# Patient Record
Sex: MALE | Race: WHITE | NOT HISPANIC OR LATINO | Employment: FULL TIME | URBAN - METROPOLITAN AREA
[De-identification: names, ages, dates, MRNs, and addresses within clinical notes are randomized per-mention and may not be internally consistent; named-entity substitution may affect disease eponyms.]

---

## 2018-04-11 ENCOUNTER — OFFICE VISIT (OUTPATIENT)
Dept: ORTHOPEDIC SURGERY | Facility: CLINIC | Age: 28
End: 2018-04-11

## 2018-04-11 VITALS
WEIGHT: 152 LBS | SYSTOLIC BLOOD PRESSURE: 119 MMHG | BODY MASS INDEX: 20.15 KG/M2 | HEART RATE: 84 BPM | DIASTOLIC BLOOD PRESSURE: 76 MMHG | HEIGHT: 73 IN

## 2018-04-11 DIAGNOSIS — Z72.0 TOBACCO ABUSE: ICD-10-CM

## 2018-04-11 DIAGNOSIS — S82.891A CLOSED FRACTURE OF RIGHT ANKLE, INITIAL ENCOUNTER: Primary | ICD-10-CM

## 2018-04-11 PROCEDURE — 99203 OFFICE O/P NEW LOW 30 MIN: CPT | Performed by: PHYSICIAN ASSISTANT

## 2018-04-11 PROCEDURE — 99406 BEHAV CHNG SMOKING 3-10 MIN: CPT | Performed by: PHYSICIAN ASSISTANT

## 2018-04-11 NOTE — PROGRESS NOTES
"NEW PATIENT    Patient: Barron Cutler  : 1990    Primary Care Provider: No Known Provider    Requesting Provider: As above    Edema and Pain of the Right Ankle      History    Chief Complaint: Right ankle pain    History of Present Illness: This is a pleasant 27-year-old male referral from urgent treatment here with 2 month history of right ankle pain since a fall down the stairs and February 3, 2018.  He complains of resolved pain at this point, but that the inside of the ankle feels \"weird\" he reports all swelling and bruising has resolved.  He reports he went to urgent treatment where x-rays were taken showing a distal medial malleolus fracture.  He was placed in a posterior splint and has been using crutches since that time.  He reports he was also given a boot but found that cumbersome.  He admits to putting some weight on the right leg as well.  He has been off work since the injury.  He states he thought he was supposed receive a call from us to schedule a appointment that is why it is taking 2 months for him to get an.    No current outpatient prescriptions on file prior to visit.     No current facility-administered medications on file prior to visit.       No Known Allergies   History reviewed. No pertinent past medical history.  No past surgical history on file.  History reviewed. No pertinent family history.   Social History     Social History   • Marital status:      Spouse name: N/A   • Number of children: N/A   • Years of education: N/A     Occupational History   • Not on file.     Social History Main Topics   • Smoking status: Current Every Day Smoker     Packs/day: 1.00     Years: 11.00     Types: Cigarettes     Start date:    • Smokeless tobacco: Never Used   • Alcohol use Yes      Comment: occasional   • Drug use: No   • Sexual activity: Defer     Other Topics Concern   • Not on file     Social History Narrative   • No narrative on file        Review of Systems   Constitutional: " "Negative.    HENT: Negative.    Eyes: Negative.    Respiratory: Negative.    Cardiovascular: Negative.    Gastrointestinal: Negative.    Endocrine: Negative.    Genitourinary: Negative.    Musculoskeletal: Positive for arthralgias and joint swelling.   Skin: Negative.    Allergic/Immunologic: Negative.    Neurological: Negative.    Hematological: Negative.    Psychiatric/Behavioral: Negative.        The following portions of the patient's history were reviewed and updated as appropriate: allergies, current medications, past family history, past medical history, past social history, past surgical history and problem list.    Physical Exam:   /76   Pulse 84   Ht 185.4 cm (73\")   Wt 68.9 kg (152 lb)   BMI 20.05 kg/m²   GENERAL: Body habitus: normal weight for height    Lower extremity edema: Left: none; Right: none    Varicose veins:  Left: none; Right: none    Gait: using crutches     Mental Status:  awake and alert; oriented to person, place, and time    Voice:  clear  SKIN:  Normal    Hair Growth:  Right:normal; Left:  normal  NAILS: Toenails: normal  HEENT: Head: Normocephalic, atraumatic,  without obvious abnormality.  PULM:  Repiratory effort normal  CV:  Dorsalis Pedis:  Right: 1+; Left:2+    Posterior Tibial: Right:1+; Left:2+    Capillary Refill:  Brisk  MSK:  Foot:  Right:  tender Mild over the medial malleolus.  No lateral tenderness., ROM  Mild stiffness and motor function  normal; Left:  non tender, ROM  normal and motor function  normal      NEURO: Lower extremity sensation: intact     Calf Atrophy:atrophy of the right calf     Motor Function: all 5/5         Medical Decision Making    Data Review:   ordered and reviewed x-rays today    Assessment and Plan/ Diagnosis/Treatment options:   1.  Right medial malleolus fracture.  I reviewed today's x-rays and clinical findings with the patient.  On exam, he is mildly tender over the medial malleolus with stiffness as expected.  He has intact " strength.  X-rays today show a healed distal medial malleolus fracture with diffuse disuse osteopenia.  I explained to the patient that we may be able to see the fracture line on x-ray long-term, but he will be able to function normally and it will be well-healed.  Recommendation today is that patient go into a tall boot weightbearing in the boot for several weeks.  I would like him to then slowly wean himself out of the boot into regular shoe.  He does not have any insurance and is unable to do formal physical therapy.  I've printed out a sheet of exercises for him to start working on strength and motion of the ankle.  I expect him to be into regular sure when he returns to see us in the office.  I will see him back in 6 weeks with repeat x-rays or sooner if needed.    2.  I explained the risks of smoking, including hardening of the arteries, gangrene, amputation.  I explained smoking poisons bone cells and increases the risk of nonunion of fractures and fusions.  I explained that studies show that smokers have FOUR times the risk of the general population when they have foot or ankle surgery.  (5min)

## 2018-06-08 ENCOUNTER — OFFICE VISIT (OUTPATIENT)
Dept: ORTHOPEDIC SURGERY | Facility: CLINIC | Age: 28
End: 2018-06-08

## 2018-06-08 VITALS — HEIGHT: 73 IN | WEIGHT: 139.55 LBS | OXYGEN SATURATION: 99 % | HEART RATE: 66 BPM | BODY MASS INDEX: 18.5 KG/M2

## 2018-06-08 DIAGNOSIS — S82.891D CLOSED FRACTURE OF RIGHT ANKLE WITH ROUTINE HEALING, SUBSEQUENT ENCOUNTER: Primary | ICD-10-CM

## 2018-06-08 PROCEDURE — 99213 OFFICE O/P EST LOW 20 MIN: CPT | Performed by: PHYSICIAN ASSISTANT

## 2018-06-08 NOTE — PROGRESS NOTES
[facility] – [place] [facility]    Subjective     Patient: Barron Cutler  : 1990    Primary Care Provider: No Known Provider    Requesting Provider: As above    Follow-up (8 weeks f/u Right medial malleolus fracture)      History    Chief Complaint: Follow-up right medial malleolus fracture    History of Present Illness: Patient returns today for follow-up of his right medial malleolus fracture with injury on February 3, 2018.  He has been weightbearing in a regular shoe outside of work.  For work, he has been using a short boot.  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  No new symptoms.    No current outpatient prescriptions on file prior to visit.     No current facility-administered medications on file prior to visit.       No Known Allergies   History reviewed. No pertinent past medical history.  No past surgical history on file.  History reviewed. No pertinent family history.   Social History     Social History   • Marital status:      Spouse name: N/A   • Number of children: N/A   • Years of education: N/A     Occupational History   • Not on file.     Social History Main Topics   • Smoking status: Current Every Day Smoker     Packs/day: 1.00     Years: 11.00     Types: Cigarettes     Start date:    • Smokeless tobacco: Never Used   • Alcohol use Yes      Comment: occasional   • Drug use: No   • Sexual activity: Defer     Other Topics Concern   • Not on file     Social History Narrative   • No narrative on file        Review of Systems   Constitutional: Negative.    HENT: Negative.    Eyes: Negative.    Respiratory: Negative.    Cardiovascular: Negative.    Gastrointestinal: Negative.    Endocrine: Negative.    Genitourinary: Negative.    Musculoskeletal: Positive for arthralgias (Right ankle).   Skin: Negative.    Allergic/Immunologic: Negative.    Neurological: Negative.    Hematological: Negative.   "  Psychiatric/Behavioral: Negative.        The following portions of the patient's history were reviewed and updated as appropriate: allergies, current medications, past family history, past medical history, past social history, past surgical history and problem list.      Objective      Physical Exam  Pulse 66   Ht 185.4 cm (72.99\")   Wt 63.3 kg (139 lb 8.8 oz)   SpO2 99%   BMI 18.42 kg/m²     Body mass index is 18.42 kg/m².      Ortho Exam  Right ankle exam:  Nontender over the medial malleolus and about the remainder of the ankle and foot  Range of motion 10° course dorsiflexion, 40° plantar flexion, 10° inversion eversion  5/5 motor strength with intact sensation  Neurovascular intact  Pulses 2+    Medical Decision Making    Data Review:   ordered and reviewed x-rays today    Assessment:  1. Closed fracture of right ankle with routine healing, subsequent encounter        Plan:  Healed right medial malleolus fracture.  I reviewed today's x-rays and clinical findings with the patient on exam, he is nontender with good range of motion and normal strength.  X-rays show the fracture is now healed.  He may now return to his normal activity.  I encouraged him to slowly go back into his regular shoe instead of wearing that boot for work.  He'll work on strengthening activity and exercises on his own.  He'll return to see us as needed.      Yasmin Rascon  06/08/18  9:58 AM  "

## 2019-09-09 ENCOUNTER — OFFICE VISIT (OUTPATIENT)
Dept: ORTHOPEDIC SURGERY | Facility: CLINIC | Age: 29
End: 2019-09-09

## 2019-09-09 VITALS — BODY MASS INDEX: 21.86 KG/M2 | WEIGHT: 164.9 LBS | HEIGHT: 73 IN

## 2019-09-09 DIAGNOSIS — S62.111A CLOSED CHIP FRACTURE OF TRIQUETRUM OF RIGHT WRIST, INITIAL ENCOUNTER: Primary | ICD-10-CM

## 2019-09-09 PROCEDURE — 99204 OFFICE O/P NEW MOD 45 MIN: CPT | Performed by: ORTHOPAEDIC SURGERY

## 2019-09-09 RX ORDER — IBUPROFEN 400 MG/1
400 TABLET ORAL EVERY 6 HOURS PRN
COMMUNITY
End: 2020-06-18

## 2019-09-09 NOTE — PROGRESS NOTES
[facility] [place] [facility]    Subjective     Chief Complaint   Patient presents with   • Right Wrist - Pain        HPI  Barron Cutler is a 28 y.o. male.  Patient fell last Thursday 9 5 and fractured his right wrist.  Pain is 3 out of 10.  He takes ibuprofen.  He works at [employer].  He is still working in the splint.    History reviewed. No pertinent past medical history.   No past surgical history on file.   History reviewed. No pertinent family history.  Social History     Socioeconomic History   • Marital status:      Spouse name: Not on file   • Number of children: Not on file   • Years of education: Not on file   • Highest education level: Not on file   Tobacco Use   • Smoking status: Current Every Day Smoker     Packs/day: 1.00     Years: 11.00     Pack years: 11.00     Types: Cigarettes     Start date: 2007   • Smokeless tobacco: Never Used   Substance and Sexual Activity   • Alcohol use: Yes     Comment: occasional   • Drug use: No   • Sexual activity: Defer      Current Outpatient Medications on File Prior to Visit   Medication Sig Dispense Refill   • ibuprofen (ADVIL,MOTRIN) 400 MG tablet Take 400 mg by mouth Every 6 (Six) Hours As Needed for Mild Pain .       No current facility-administered medications on file prior to visit.       No Known Allergies     The following portions of the patient's history were reviewed and updated as appropriate: allergies, current medications, past family history, past medical history, past social history, past surgical history and problem list.    Review of Systems   Constitutional: Negative.    HENT: Negative.    Eyes: Negative.    Respiratory: Negative.    Cardiovascular: Negative.    Gastrointestinal: Negative.    Endocrine: Negative.    Genitourinary: Negative.    Musculoskeletal: Positive for arthralgias and joint swelling.   Skin: Negative.    Allergic/Immunologic: Negative.    Neurological: Negative.    Hematological: Negative.   "  Psychiatric/Behavioral: Negative.         Objective      Physical Exam  Ht 185.4 cm (72.99\")   Wt 74.8 kg (164 lb 14.5 oz)   BMI 21.76 kg/m²     Body mass index is 21.76 kg/m².    GENERAL APPEARANCE: awake, alert & oriented x 3, in no acute distress and well developed, well nourished  PSYCH: normal mood and affect  LUNGS:  breathing nonlabored, no wheezing  EYES: sclera anicteric, pupils equal  CARDIOVASCULAR: palpable pulses dorsalis pedis, palpable posterior tibial bilaterally. Capillary refill less than 2 seconds  INTEGUMENTARY: skin intact, no clubbing, cyanosis  NEUROLOGIC:  Normal Sensation and reflexes       Ortho Exam  Peripheral Vascular   Right Upper Extremity    No cyanotic nail beds    Pink nail beds and rapid capillary refill   Palpation    Radial Pulse - Bilaterally normal    Musculoskeletal   Right Upper Extremity   Radius:    Inspection and Palpation:    Tenderness - exquisitely tender and about the wrist    Swelling - hematoma    Effusion - none    Muscle tone - no atrophy    Pulses - +2   Deformities/Malalignments/Discrepancies    Normal bony contour    There is a documented closed fracture : location - right -triquetral bone             Imaging/Studies  Imaging Results (last 7 days)     ** No results found for the last 168 hours. **      I viewed the x-rays from September 9 which show a nondisplaced right triquetral fracture    Assessment/Plan        ICD-10-CM ICD-9-CM   1. Closed chip fracture of triquetrum of right wrist, initial encounter S62.111A 814.03     He was placed in a right short arm fiberglass cast.  He will follow-up in 3 weeks for cast removal and x-rays.  He will take ibuprofen for pain.  He may work as tolerated in the cast.  Medical Decision Making  Management Options : over-the-counter medicine and close treatment of fracture or dislocation  Data/Risk: radiology tests and independent visualization of imaging, lab tests, or EMG/NCV    Ajit Bess MD  09/09/19  2:49 " ALONDRA Leija/Transcription disclaimer:  Much of this encounter note is an electronic transcription of spoken language to printed text. Electronic transcription of spoken language may permit erroneous, or at times, nonsensical words or phrases to be inadvertently transcribed. Although I have reviewed the note for such errors, some may still exist.

## 2019-09-30 ENCOUNTER — TELEPHONE (OUTPATIENT)
Dept: ORTHOPEDIC SURGERY | Facility: CLINIC | Age: 29
End: 2019-09-30

## 2019-10-02 ENCOUNTER — TELEPHONE (OUTPATIENT)
Dept: ORTHOPEDIC SURGERY | Facility: CLINIC | Age: 29
End: 2019-10-02

## 2019-10-02 NOTE — TELEPHONE ENCOUNTER
PATIENT WILL BE OUT OF TOWN ON 10/07. THIS WAS A 3 WK FOLLOW UP. IS THE PATIENT ABLE TO COME SEE A PA WHILE HE IS GONE?

## 2019-10-03 ENCOUNTER — OFFICE VISIT (OUTPATIENT)
Dept: ORTHOPEDIC SURGERY | Facility: CLINIC | Age: 29
End: 2019-10-03

## 2019-10-03 VITALS — OXYGEN SATURATION: 99 % | BODY MASS INDEX: 20.41 KG/M2 | WEIGHT: 154 LBS | HEIGHT: 73 IN | HEART RATE: 75 BPM

## 2019-10-03 DIAGNOSIS — S52.502A CLOSED FRACTURE OF DISTAL END OF LEFT RADIUS, UNSPECIFIED FRACTURE MORPHOLOGY, INITIAL ENCOUNTER: ICD-10-CM

## 2019-10-03 DIAGNOSIS — S62.111D CLOSED CHIP FRACTURE OF RIGHT TRIQUETRUM WITH ROUTINE HEALING, SUBSEQUENT ENCOUNTER: ICD-10-CM

## 2019-10-03 DIAGNOSIS — Z09 FRACTURE FOLLOW-UP: Primary | ICD-10-CM

## 2019-10-03 PROCEDURE — 99213 OFFICE O/P EST LOW 20 MIN: CPT | Performed by: PHYSICIAN ASSISTANT

## 2019-10-03 NOTE — PROGRESS NOTES
[facility] – [place] [facility] Note    Subjective     Patient: Barron Cutler  : 1990    Primary Care Provider: Provider, No Known    Requesting Provider: As above    Follow-up of the Right Wrist (Closed Chip Fracture of Triquetrum of Right Wrist)      History    Chief Complaint: Right wrist follow-up    History of Present Illness: Patient returns today for follow-up of his right triquetrum fracture.  He has been casted for 1 month.  He reports no pain.  He is continued working at [employer] without difficulty.    Current Outpatient Medications on File Prior to Visit   Medication Sig Dispense Refill   • ibuprofen (ADVIL,MOTRIN) 400 MG tablet Take 400 mg by mouth Every 6 (Six) Hours As Needed for Mild Pain .       No current facility-administered medications on file prior to visit.       No Known Allergies   History reviewed. No pertinent past medical history.  History reviewed. No pertinent surgical history.  Family History   Problem Relation Age of Onset   • No Known Problems Mother    • No Known Problems Father    • No Known Problems Sister    • No Known Problems Brother    • No Known Problems Maternal Aunt    • No Known Problems Maternal Uncle    • No Known Problems Paternal Aunt    • No Known Problems Paternal Uncle    • No Known Problems Maternal Grandmother    • No Known Problems Maternal Grandfather    • No Known Problems Paternal Grandmother    • No Known Problems Paternal Grandfather    • No Known Problems Other    • Anesthesia problems Neg Hx    • Broken bones Neg Hx    • Cancer Neg Hx    • Clotting disorder Neg Hx    • Collagen disease Neg Hx    • Diabetes Neg Hx    • Dislocations Neg Hx    • Osteoporosis Neg Hx    • Rheumatologic disease Neg Hx    • Scoliosis Neg Hx    • Severe sprains Neg Hx       Social History     Socioeconomic History   • Marital status:      Spouse name: Not on file   • Number of children: Not on file   • Years of education: Not on file   • Highest education  "level: Not on file   Tobacco Use   • Smoking status: Current Every Day Smoker     Packs/day: 1.00     Years: 11.00     Pack years: 11.00     Types: Cigarettes     Start date: 2007   • Smokeless tobacco: Never Used   Substance and Sexual Activity   • Alcohol use: Yes     Comment: occasional   • Drug use: No   • Sexual activity: Defer        Review of Systems   Constitutional: Negative.    HENT: Negative.    Eyes: Negative.    Respiratory: Negative.    Cardiovascular: Negative.    Gastrointestinal: Negative.    Endocrine: Negative.    Genitourinary: Negative.    Musculoskeletal: Positive for arthralgias.   Skin: Negative.    Allergic/Immunologic: Negative.    Neurological: Negative.    Hematological: Negative.    Psychiatric/Behavioral: Negative.        The following portions of the patient's history were reviewed and updated as appropriate: allergies, current medications, past family history, past medical history, past social history, past surgical history and problem list.      Objective      Physical Exam  Pulse 75   Ht 185.4 cm (72.99\")   Wt 69.9 kg (154 lb)   SpO2 99%   BMI 20.32 kg/m²     Body mass index is 20.32 kg/m².    Patient is well developed, well nourished and in no acute distress.  Alert and oriented x 3.    Ortho Exam  Right wrist exam:   Nontender to palpation  Mild stiffness in the wrist as expected patient able to make a composite fist  With normal motor strength and sensation pulses 2+      Medical Decision Making    Data Review:   ordered and reviewed x-rays today    Assessment:  1. Fracture follow-up    2. Closed chip fracture of right triquetrum with routine healing, subsequent encounter        Plan:  Doing well with nonoperative treatment of right triquetrum fracture.  X-rays today show evidence of interval consolidation of a nondisplaced distal radius fracture.  As well as consolidation of the right triquetrum fracture.  Plan today is the patient go into a removable brace.  He may come out " of the brace to bathe.  He will return to see Dr. Bess in 3 to 4 weeks or sooner if needed.    History, diagnosis and treatment plan discussed with Dr. Martínez.          Jodie Schwab PA-C  10/04/19  1:58 PM

## 2019-10-07 ENCOUNTER — TELEPHONE (OUTPATIENT)
Dept: ORTHOPEDIC SURGERY | Facility: CLINIC | Age: 29
End: 2019-10-07

## 2019-10-25 ENCOUNTER — TELEPHONE (OUTPATIENT)
Dept: ORTHOPEDIC SURGERY | Facility: CLINIC | Age: 29
End: 2019-10-25